# Patient Record
Sex: FEMALE | Race: BLACK OR AFRICAN AMERICAN | Employment: OTHER | URBAN - METROPOLITAN AREA
[De-identification: names, ages, dates, MRNs, and addresses within clinical notes are randomized per-mention and may not be internally consistent; named-entity substitution may affect disease eponyms.]

---

## 2018-06-29 ENCOUNTER — HOSPITAL ENCOUNTER (EMERGENCY)
Age: 33
Discharge: HOME | End: 2018-06-29
Payer: COMMERCIAL

## 2018-06-29 VITALS
RESPIRATION RATE: 18 BRPM | BODY MASS INDEX: 22.08 KG/M2 | TEMPERATURE: 98.2 F | HEIGHT: 62 IN | DIASTOLIC BLOOD PRESSURE: 59 MMHG | HEART RATE: 89 BPM | WEIGHT: 120 LBS | OXYGEN SATURATION: 97 % | SYSTOLIC BLOOD PRESSURE: 106 MMHG

## 2018-06-29 DIAGNOSIS — N30.01 ACUTE CYSTITIS WITH HEMATURIA: ICD-10-CM

## 2018-06-29 DIAGNOSIS — R31.1 BENIGN ESSENTIAL MICROSCOPIC HEMATURIA: Primary | ICD-10-CM

## 2018-06-29 LAB
BACTERIA: ABNORMAL /HPF
BILIRUBIN URINE: NEGATIVE
BLOOD, URINE: ABNORMAL
CHP ED QC CHECK: NORMAL
CLARITY: ABNORMAL
COLOR: YELLOW
EPITHELIAL CELLS, UA: ABNORMAL /HPF
GLUCOSE URINE: NEGATIVE MG/DL
KETONES, URINE: NEGATIVE MG/DL
LEUKOCYTE ESTERASE, URINE: ABNORMAL
NITRITE, URINE: NEGATIVE
PH UA: 6 (ref 5–9)
PREGNANCY TEST URINE, POC: NEGATIVE
PROTEIN UA: ABNORMAL MG/DL
RBC UA: >20 /HPF (ref 0–2)
SPECIFIC GRAVITY UA: >=1.03 (ref 1–1.03)
UROBILINOGEN, URINE: 0.2 E.U./DL
WBC UA: ABNORMAL /HPF (ref 0–5)

## 2018-06-29 PROCEDURE — 81001 URINALYSIS AUTO W/SCOPE: CPT

## 2018-06-29 PROCEDURE — 99283 EMERGENCY DEPT VISIT LOW MDM: CPT

## 2018-06-29 RX ORDER — PHENAZOPYRIDINE HYDROCHLORIDE 100 MG/1
100 TABLET, FILM COATED ORAL 3 TIMES DAILY PRN
Qty: 9 TABLET | Refills: 0 | Status: SHIPPED | OUTPATIENT
Start: 2018-06-29 | End: 2018-07-02

## 2018-06-29 RX ORDER — MELOXICAM 7.5 MG/1
7.5 TABLET ORAL DAILY
COMMUNITY
End: 2018-09-05 | Stop reason: ALTCHOICE

## 2018-06-29 RX ORDER — NITROFURANTOIN 25; 75 MG/1; MG/1
100 CAPSULE ORAL 2 TIMES DAILY
Qty: 20 CAPSULE | Refills: 0 | Status: SHIPPED | OUTPATIENT
Start: 2018-06-29 | End: 2018-07-09

## 2018-06-29 ASSESSMENT — PAIN DESCRIPTION - PAIN TYPE: TYPE: ACUTE PAIN

## 2018-06-29 ASSESSMENT — PAIN DESCRIPTION - LOCATION: LOCATION: HEAD;NECK

## 2018-06-29 ASSESSMENT — PAIN DESCRIPTION - DESCRIPTORS: DESCRIPTORS: ACHING

## 2018-06-29 ASSESSMENT — PAIN SCALES - GENERAL: PAINLEVEL_OUTOF10: 1
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
## 2018-07-22 ENCOUNTER — HOSPITAL ENCOUNTER (OUTPATIENT)
Age: 33
Discharge: HOME | End: 2018-07-24
Payer: COMMERCIAL

## 2018-07-22 ENCOUNTER — HOSPITAL ENCOUNTER (OUTPATIENT)
Dept: GENERAL RADIOLOGY | Age: 33
Discharge: HOME | End: 2018-07-24
Payer: COMMERCIAL

## 2018-07-22 DIAGNOSIS — R52 PAIN: ICD-10-CM

## 2018-07-22 PROCEDURE — 73562 X-RAY EXAM OF KNEE 3: CPT

## 2018-08-07 ENCOUNTER — HOSPITAL ENCOUNTER (OUTPATIENT)
Dept: PSYCHIATRY | Age: 33
Setting detail: THERAPIES SERIES
Discharge: HOME | End: 2018-08-07
Payer: COMMERCIAL

## 2018-08-07 VITALS
WEIGHT: 115 LBS | HEART RATE: 78 BPM | TEMPERATURE: 96.3 F | SYSTOLIC BLOOD PRESSURE: 118 MMHG | RESPIRATION RATE: 16 BRPM | BODY MASS INDEX: 21.16 KG/M2 | HEIGHT: 62 IN | DIASTOLIC BLOOD PRESSURE: 78 MMHG

## 2018-08-07 PROCEDURE — 90791 PSYCH DIAGNOSTIC EVALUATION: CPT

## 2018-08-07 PROCEDURE — 80305 DRUG TEST PRSMV DIR OPT OBS: CPT

## 2018-08-07 ASSESSMENT — LIFESTYLE VARIABLES: HISTORY_ALCOHOL_USE: NO

## 2018-08-20 ENCOUNTER — HOSPITAL ENCOUNTER (OUTPATIENT)
Dept: PSYCHIATRY | Age: 33
Setting detail: THERAPIES SERIES
Discharge: HOME | End: 2018-08-20
Payer: COMMERCIAL

## 2018-08-20 PROCEDURE — 90853 GROUP PSYCHOTHERAPY: CPT

## 2018-08-20 PROCEDURE — 90834 PSYTX W PT 45 MINUTES: CPT

## 2018-08-20 PROCEDURE — 80305 DRUG TEST PRSMV DIR OPT OBS: CPT

## 2018-08-20 PROCEDURE — 99222 1ST HOSP IP/OBS MODERATE 55: CPT | Performed by: FAMILY MEDICINE

## 2018-08-21 ENCOUNTER — HOSPITAL ENCOUNTER (OUTPATIENT)
Dept: PSYCHIATRY | Age: 33
Setting detail: THERAPIES SERIES
Discharge: HOME | End: 2018-08-21
Payer: COMMERCIAL

## 2018-08-21 PROCEDURE — 80305 DRUG TEST PRSMV DIR OPT OBS: CPT

## 2018-08-21 PROCEDURE — 90853 GROUP PSYCHOTHERAPY: CPT

## 2018-08-21 PROCEDURE — 90834 PSYTX W PT 45 MINUTES: CPT

## 2018-08-21 NOTE — H&P
[address]                               HISTORY AND PHYSICAL    PATIENT NAME: Tory Scheuermann                   :        1985  MED REC NO:   [id number]                            ROOM:  ACCOUNT NO:   [de-identified]                           ADMIT DATE: 2018  PROVIDER:     Sangita Cole MD    HISTORY OF PRESENT ILLNESS:  This 70-year-old female admitted with a  history of chemical dependency. The patient states that she was tested  positive for marijuana at the pain clinic, so had been sent in for  rehabilitation program.  The patient has got chronic pain, has been on  multiple pain medications. Also, the patient had been going to the family  physician and had been taking medication for her generalized weakness and  the pain. Does not give any relevant past medical or surgical history. REVIEW OF SYSTEMS:  HEENT:  Normal.  HEART:  Normal.  LUNGS:  Normal.   ABDOMEN:  Normal.  EXTREMITIES:  Normal.  NEUROLOGICAL:  The patient is  stable. No relevant family or social history obtainable. PHYSICAL EXAMINATION:  GENERAL:  At the time of the admission, the patient is walking with a  support canes. HEART:  Normal sinus rhythm. No audible murmurs. LUNGS:  Clear. Fair, equal, and good air entry. ABDOMEN:  Soft. No organomegaly. No masses. EXTREMITIES:  Within normal limits. NEUROLOGICALLY:  The patient is stable. DIAGNOSTIC IMPRESSION:  Chemical dependency, chronic pain. PLAN OF TREATMENT:  Rehabilitation as per the counselor. Followup with the  pain clinic and the family physician, aftercare.         Jacki Dowling MD    D: 2018 18:43:01       T: 2018 19:48:32     [email or web address]  Job#: [id number]     Doc#: [id number]    CC:

## 2018-08-24 ENCOUNTER — HOSPITAL ENCOUNTER (OUTPATIENT)
Dept: PSYCHIATRY | Age: 33
Setting detail: THERAPIES SERIES
Discharge: HOME | End: 2018-08-24
Payer: COMMERCIAL

## 2018-08-24 PROCEDURE — 90834 PSYTX W PT 45 MINUTES: CPT

## 2018-08-24 PROCEDURE — 90853 GROUP PSYCHOTHERAPY: CPT

## 2018-08-28 ENCOUNTER — HOSPITAL ENCOUNTER (OUTPATIENT)
Dept: PSYCHIATRY | Age: 33
Setting detail: THERAPIES SERIES
Discharge: HOME | End: 2018-08-28
Payer: COMMERCIAL

## 2018-08-28 PROCEDURE — 90834 PSYTX W PT 45 MINUTES: CPT

## 2018-08-28 PROCEDURE — 90853 GROUP PSYCHOTHERAPY: CPT

## 2018-08-28 PROCEDURE — 80305 DRUG TEST PRSMV DIR OPT OBS: CPT

## 2018-08-28 NOTE — PLAN OF CARE
and 12 sessions of aftercare.      1. Identify 3-5 past experiences with obsessive thoughts of using marijuana or any other substance of abuse or addiction, share insights during group session and accept feedback from peers. Objective to be met by October 20, 2018.      2. List 2-4 events/situations that lead to cravings and urges to use marijuana or any other substances of abuse or addiction, and then identify and list 2-4 coping skills that have helped you to manage cravings to use marijuana. Objective to be met by October 20, 2018.      3. Share 3-6 behaviors that have assisted you in managing any cravings to use  Marijuana or other substances of abuse or addiction. Ask for feedback from peers in order to add additional behaviors that will assist you in managing cravings and urges to use marijuana or other substances of abuse or addiction.  Objective to be met by October 20, 2018.

## 2018-08-29 ENCOUNTER — HOSPITAL ENCOUNTER (OUTPATIENT)
Dept: PSYCHIATRY | Age: 33
Setting detail: THERAPIES SERIES
Discharge: HOME | End: 2018-08-29
Payer: COMMERCIAL

## 2018-08-29 PROCEDURE — 90853 GROUP PSYCHOTHERAPY: CPT

## 2018-08-29 PROCEDURE — 90834 PSYTX W PT 45 MINUTES: CPT

## 2018-08-30 NOTE — PROGRESS NOTES
Therapy Note    Date:08/24/2018   Start Time: 5:30 PM   End Time: 8:00 PM  Number of Participants 7    Support Meetings attended:0    Type of Group: IOP  Topic: Daily check in exercise - Discussion on identifying Healthy Coping Skills/ Education on the Importance of Changing People, Places and Things. Patient's Goal:  Maintain abstinence/ Develop sober support / Manage cravings & urges/ Eliminate legal problems from [employer].     Notes: 60 minutes: Counselor facilitated daily check in exercise: Carlos Horne shared that she has not had any recent cravings to use marijuana, however she also shared that \"not smoking seems strange. \" She further shared, \"I have been smoking (marijuana) since I was like [age]years old\". 60 minutes: Counselor lead clients in discussion on identifying healthy coping skills that are used to enhance recovery from AOD. Initially Carlos Horne seemed not to recognize what coping skills she has used to help her maintain her abstinence. Upon further reflection Carlos Horne shared that she relies on music to help her with feelings of anxiety or anger, and since she often used marijuana in response to anger and anxiety she agrees that music is a healthy coping skill for when she is at risk of using marijuana. 60 minutes: Counselor provided education on why it is necessary to change people, places and things in recovery. Carlos Horne was engaged in discussion that followed psycho-educational portion of group session. She shared that building a support group and getting a sponsor are two ways of changing people, places and things. Urinalysis Submitted Today? No    Urinalysis Results: 0    If Positive, for which substance:0    Date of Urinalysis Results Review: 0     Urinalysis Results Signed by Client? No    Status After Intervention:  Improved    Participation Level:  Active Listener and Interactive    Participation Quality: Appropriate, Attentive and Sharing      Speech:  normal      Thought Process/Content: Logical      Level of consciousness:  Alert, Oriented x4 and Attentive      Response to Learning: Able to retain information      Endings: None reported     Modes of Intervention: Education, Support, Socialization, Clarifying, Problem-solving and Activity      Discipline Responsible: [facility]      Signature:  Meagan Reaves

## 2018-08-31 ENCOUNTER — HOSPITAL ENCOUNTER (OUTPATIENT)
Dept: PSYCHIATRY | Age: 33
Setting detail: THERAPIES SERIES
Discharge: HOME | End: 2018-08-31
Payer: COMMERCIAL

## 2018-08-31 PROCEDURE — 90834 PSYTX W PT 45 MINUTES: CPT

## 2018-08-31 PROCEDURE — 90853 GROUP PSYCHOTHERAPY: CPT

## 2018-09-04 NOTE — PLAN OF CARE
Problem: Substance Abuse:  Goal: Patient specific goal  Patient specific goal   Treatment Goal #4: Over the remaining 12-18 sessions of [facility] Alirio Hansen will learn about the characteristics of unhealthy boundaries with others and determine how her inability to set healthy boundaries contributes to her addiction. 1). Read assigned literature about boundaries and identify what type of boundaries you have with others. Share 5-7 examples, during group sessions, to support your findings. Objective to be met by October 20th, 2018. 2). List 3 potential dangers to self or others that can occur as a result of not establishing healthy boundaries to support your recovery process. Objective to be met by October 20th, 2018. 3). Develop a written plan including 5-7 ideas you can apply to your life in order to strengthen your boundaries with others and enhance your recovery. Share in group and ask for feedback from peers, in order to expand upon your own ideas. Objective to be met by October 20th, 2018.

## 2018-09-04 NOTE — PROGRESS NOTES
Oriented x4 and Attentive      Response to Learning: Able to retain information and Capable of insight      Endings: None Reported    Modes of Intervention: Education, Support, Socialization, Exploration, Clarifying, Problem-solving, Activity and Media      Discipline Responsible: [facility]      Signature:  Meagan Austin

## 2018-09-05 ENCOUNTER — HOSPITAL ENCOUNTER (EMERGENCY)
Age: 33
Discharge: HOME | End: 2018-09-05
Attending: EMERGENCY MEDICINE
Payer: COMMERCIAL

## 2018-09-05 ENCOUNTER — HOSPITAL ENCOUNTER (OUTPATIENT)
Dept: PSYCHIATRY | Age: 33
Setting detail: THERAPIES SERIES
Discharge: HOME | End: 2018-09-05
Payer: COMMERCIAL

## 2018-09-05 VITALS
RESPIRATION RATE: 20 BRPM | BODY MASS INDEX: 20.33 KG/M2 | OXYGEN SATURATION: 100 % | DIASTOLIC BLOOD PRESSURE: 80 MMHG | TEMPERATURE: 98.4 F | SYSTOLIC BLOOD PRESSURE: 114 MMHG | WEIGHT: 110.5 LBS | HEIGHT: 62 IN | HEART RATE: 90 BPM

## 2018-09-05 DIAGNOSIS — G43.909 MIGRAINE WITHOUT STATUS MIGRAINOSUS, NOT INTRACTABLE, UNSPECIFIED MIGRAINE TYPE: Primary | ICD-10-CM

## 2018-09-05 PROCEDURE — 96372 THER/PROPH/DIAG INJ SC/IM: CPT

## 2018-09-05 PROCEDURE — G0382 LEV 3 HOSP TYPE B ED VISIT: HCPCS

## 2018-09-05 PROCEDURE — 6360000002 HC RX W HCPCS: Performed by: EMERGENCY MEDICINE

## 2018-09-05 PROCEDURE — 99283 EMERGENCY DEPT VISIT LOW MDM: CPT

## 2018-09-05 RX ORDER — TRIAMCINOLONE ACETONIDE 40 MG/ML
40 INJECTION, SUSPENSION INTRA-ARTICULAR; INTRAMUSCULAR ONCE
Status: COMPLETED | OUTPATIENT
Start: 2018-09-05 | End: 2018-09-05

## 2018-09-05 RX ORDER — DEXAMETHASONE SODIUM PHOSPHATE 10 MG/ML
10 INJECTION, SOLUTION INTRAMUSCULAR; INTRAVENOUS ONCE
Status: DISCONTINUED | OUTPATIENT
Start: 2018-09-05 | End: 2018-09-05

## 2018-09-05 RX ORDER — KETOROLAC TROMETHAMINE 30 MG/ML
60 INJECTION, SOLUTION INTRAMUSCULAR; INTRAVENOUS ONCE
Status: COMPLETED | OUTPATIENT
Start: 2018-09-05 | End: 2018-09-05

## 2018-09-05 RX ADMIN — TRIAMCINOLONE ACETONIDE 40 MG: 40 INJECTION, SUSPENSION INTRA-ARTICULAR; INTRAMUSCULAR at 18:35

## 2018-09-05 RX ADMIN — KETOROLAC TROMETHAMINE 60 MG: 30 INJECTION, SOLUTION INTRAMUSCULAR at 18:33

## 2018-09-05 ASSESSMENT — PAIN SCALES - GENERAL
PAINLEVEL_OUTOF10: 10
PAINLEVEL_OUTOF10: 10

## 2018-09-05 ASSESSMENT — PAIN DESCRIPTION - LOCATION: LOCATION: HEAD

## 2018-09-05 NOTE — ED PROVIDER NOTES
HPI:  18,   Time: 6:55 PM         Calvin Faustin is a 28 y.o. female presenting to the ED for migraine headache, beginning 2 days ago. The complaint has been constant, severe in severity, and worsened by nothing. ROS:   Pertinent positives and negatives are stated within HPI, all other systems reviewed and are negative.  --------------------------------------------- PAST HISTORY ---------------------------------------------  Past Medical History:  has a past medical history of Anxiety; Arthritis; Back pain; Chronic back pain; Chronic knee pain; Depression; Headache(784.0); PTSD (post-traumatic stress disorder); Scoliosis; and Sickle cell anemia ([facility] [address] Past Surgical History:  has a past surgical history that includes  section; laparotomy; Patellar tendon repair (5/10/2012); and Hardware Removal (Right, 7/15/13). Social History:  reports that she has been smoking. She has a 15.00 pack-year smoking history. She has never used smokeless tobacco. She reports that she does not drink alcohol or use drugs. Family History: family history is not on file. The patients home medications have been reviewed. Allergies: Hydrocodone and Pcn [penicillins]    -------------------------------------------------- RESULTS -------------------------------------------------  All laboratory and radiology results have been personally reviewed by myself   LABS:  No results found for this visit on 18. RADIOLOGY:  Interpreted by Radiologist.  No orders to display       ------------------------- NURSING NOTES AND VITALS REVIEWED ---------------------------   The nursing notes within the ED encounter and vital signs as below have been reviewed. /80   Pulse 90   Temp 98.4 °F (36.9 °C) (Oral)   Resp 20   Ht 5' 2\" (1.575 m)   Wt 110 lb 8 oz (50.1 kg)   LMP 2018 (Exact Date)   SpO2 100%   Breastfeeding?  No   BMI 20.21 kg/m²   Oxygen Saturation Interpretation:

## 2018-09-06 NOTE — PROGRESS NOTES
Therapy Note    Date:08/31/2018   Start Time: 5:30 PM   End Time: 8:00 PM  Number of Participants:8    Support Meetings attended:0    Type of Group: IOP  Topic: Daily check in exercise - Activity focused on developing skills for choosing a 12 step sponsor  Education on setting boundaries with others. Patient's Goal:  Maintain abstinence/ Develop sober support / Manage cravings & urges/ Eliminate legal problems from [employer].     Notes: 60 minutes: Counselor facilitated daily check in exercise: Lynnette Fleming participated in the daily check in process by sharing that that she has had cravings to use marijuana which she attributes to stress related to moving. She also shared that concerns about other people can be a trigger for her. Lynnette Fleming denies having made any attempt to increase sober support and has not provided any documentation of having attended any sober support group meetings. Client was reminded that attending sober support group meetings is essential to the recovery process as well as successful completion of the IOP level of care. 60 minutes: Counselor lead clients in an activity aimed at developing skills for choosing a 12 step sponsor. Client was also reminded that the only way of obtaining a 12 step sponsor is by attending 12 step meetings. 60 minutes: Counselor provided education on the importance of setting boundaries with others. Lynnette Fleming was especially engaged in discussion of this topic, and indicated that she has\" trouble saying no to others. \" Counselor asked client to explore how inability to say no negatively impacts clients commitment to recovery. Counselor will comprise a treatment plan objective to assist client with exploring the topic of setting boundaries. Urinalysis Submitted Today? No    Urinalysis Results: negative    If Positive, for which substance:0    Date of Urinalysis Results Review: 0     Urinalysis Results Signed by Client?  No    Status After Intervention:  Improved    Participation Level:  Active Listener and Interactive    Participation Quality: Appropriate, Attentive and Sharing      Speech:  normal      Thought Process/Content: Logical      Level of consciousness:  Alert, Oriented x4 and Attentive      Response to Learning: Able to retain information and Capable of insight      Endings: None Reported    Modes of Intervention: Education, Support, Socialization, Exploration, Clarifying, Problem-solving and Activity      Discipline Responsible: [facility]      Signature:  Meagan Muro

## 2018-09-06 NOTE — PROGRESS NOTES
[address]      Client Name: Ayde Donahue  Date of Admission: 08/020/18    Discharge Date: 09/05/2018      Diagnosis: F12.20, F11.10, F7.200   Authorized Person's  Name: Nancy Garzon          License: [facility]    Date: 1/8/1699      Degree of Severity- ADMISSION  Degree of Severity- DISCHARGE    Acute Intoxication withdrawal none Acute Intoxication withdrawal none   Biomedical Conditions/  Complications low Biomedical Conditions/  Complications low   Emotional Behavioral/ Cognitive Conditions low Emotional Behavioral/ Cognitive Conditions low   Treatment Acceptance Resistance moderate Treatment Acceptance Resistance high   Relapse Potential moderate Relapse Potential high   Recovery Environment moderate Recovery Environment moderate       Comments on Dimensional Criteria: 1. No withdrawals reported on admission or discharge. #2 Client reported receiving treatment for biomedical issues. #3 Client did not report receiving treatment for emotional conditions. #4 Client demonstrated no resistance to the program and acceptance of education provided during group sessions. Client was able to acknowledge the need for education. #5 Client has attended the (2) required AA/NA meetings for the treatment requirement and her risk for relapse is low due to client making good progress and demonstrating insight into her poor choices, taking responsibility for her actions and developing coping skills.    #6 Clients environment is low due to client reports that her home is substance free***    Level of Care and Service Provided during Course of Treatment: ***    Client's Response to Treatment: ***    Recommendations and/or Referral for Additional AOD Addiction Treatment or other services: ***    Electronically signed by Nancy Garzon on 9/6/2018 at 5:56 PM
by client's demonstrated inability to adhere to treatment recommendations or comply with program policies. Therefore client was administratively discharged from the [facility] level of care as unsuccessful due to non-compliance. Recommendations and/or Referral for Additional AOD Addiction Treatment or other services: Client was recommended to follow up at [facility].      Electronically signed by Indira Blankenship on 9/6/2018 at 6:10 PM

## 2019-01-29 ENCOUNTER — HOSPITAL ENCOUNTER (OUTPATIENT)
Dept: GENERAL RADIOLOGY | Age: 34
Discharge: HOME | End: 2019-01-31
Payer: COMMERCIAL

## 2019-01-29 ENCOUNTER — HOSPITAL ENCOUNTER (OUTPATIENT)
Age: 34
Discharge: HOME | End: 2019-01-31
Payer: COMMERCIAL

## 2019-01-29 DIAGNOSIS — R52 PAIN: ICD-10-CM

## 2019-01-29 PROCEDURE — 72100 X-RAY EXAM L-S SPINE 2/3 VWS: CPT

## 2019-04-01 ENCOUNTER — HOSPITAL ENCOUNTER (OUTPATIENT)
Dept: GENERAL RADIOLOGY | Age: 34
Discharge: HOME | End: 2019-04-03
Payer: COMMERCIAL

## 2019-04-01 ENCOUNTER — HOSPITAL ENCOUNTER (OUTPATIENT)
Age: 34
Discharge: HOME | End: 2019-04-03
Payer: COMMERCIAL

## 2019-04-01 DIAGNOSIS — M25.552 PAIN OF LEFT HIP JOINT: ICD-10-CM

## 2019-04-01 PROCEDURE — 73502 X-RAY EXAM HIP UNI 2-3 VIEWS: CPT

## 2020-12-29 ENCOUNTER — HOSPITAL ENCOUNTER (EMERGENCY)
Age: 35
Discharge: HOME | End: 2020-12-29
Attending: EMERGENCY MEDICINE
Payer: COMMERCIAL

## 2020-12-29 ENCOUNTER — APPOINTMENT (OUTPATIENT)
Dept: GENERAL RADIOLOGY | Age: 35
End: 2020-12-29
Payer: COMMERCIAL

## 2020-12-29 VITALS
RESPIRATION RATE: 18 BRPM | SYSTOLIC BLOOD PRESSURE: 138 MMHG | OXYGEN SATURATION: 98 % | HEART RATE: 63 BPM | DIASTOLIC BLOOD PRESSURE: 66 MMHG | TEMPERATURE: 97.1 F

## 2020-12-29 LAB
ACANTHOCYTES: ABNORMAL
ALBUMIN SERPL-MCNC: 4 G/DL (ref 3.5–5.2)
ALP BLD-CCNC: 62 U/L (ref 35–104)
ALT SERPL-CCNC: 21 U/L (ref 0–32)
ANION GAP SERPL CALCULATED.3IONS-SCNC: 8 MMOL/L (ref 7–16)
ANISOCYTOSIS: ABNORMAL
AST SERPL-CCNC: 38 U/L (ref 0–31)
BACTERIA: ABNORMAL /HPF
BASOPHILS ABSOLUTE: 0 E9/L (ref 0–0.2)
BASOPHILS RELATIVE PERCENT: 0.6 % (ref 0–2)
BILIRUB SERPL-MCNC: 0.2 MG/DL (ref 0–1.2)
BILIRUBIN URINE: NEGATIVE
BLOOD, URINE: ABNORMAL
BUN BLDV-MCNC: 11 MG/DL (ref 6–20)
CALCIUM SERPL-MCNC: 9.1 MG/DL (ref 8.6–10.2)
CHLORIDE BLD-SCNC: 102 MMOL/L (ref 98–107)
CLARITY: CLEAR
CO2: 27 MMOL/L (ref 22–29)
COLOR: YELLOW
CREAT SERPL-MCNC: 0.7 MG/DL (ref 0.5–1)
EOSINOPHILS ABSOLUTE: 0 E9/L (ref 0.05–0.5)
EOSINOPHILS RELATIVE PERCENT: 0.4 % (ref 0–6)
EPITHELIAL CELLS, UA: ABNORMAL /HPF
GFR AFRICAN AMERICAN: >60
GFR NON-AFRICAN AMERICAN: >60 ML/MIN/1.73
GLUCOSE BLD-MCNC: 138 MG/DL (ref 74–99)
GLUCOSE URINE: NEGATIVE MG/DL
HCG, URINE, POC: NEGATIVE
HCT VFR BLD CALC: 31 % (ref 34–48)
HEMOGLOBIN: 8.9 G/DL (ref 11.5–15.5)
HYPOCHROMIA: ABNORMAL
KETONES, URINE: NEGATIVE MG/DL
LEUKOCYTE ESTERASE, URINE: NEGATIVE
LYMPHOCYTES ABSOLUTE: 0.76 E9/L (ref 1.5–4)
LYMPHOCYTES RELATIVE PERCENT: 13.9 % (ref 20–42)
Lab: NORMAL
MCH RBC QN AUTO: 18.6 PG (ref 26–35)
MCHC RBC AUTO-ENTMCNC: 28.7 % (ref 32–34.5)
MCV RBC AUTO: 64.9 FL (ref 80–99.9)
MONOCYTES ABSOLUTE: 0.32 E9/L (ref 0.1–0.95)
MONOCYTES RELATIVE PERCENT: 6.1 % (ref 2–12)
NEGATIVE QC PASS/FAIL: NORMAL
NEUTROPHILS ABSOLUTE: 4.32 E9/L (ref 1.8–7.3)
NEUTROPHILS RELATIVE PERCENT: 80 % (ref 43–80)
NITRITE, URINE: POSITIVE
OVALOCYTES: ABNORMAL
PDW BLD-RTO: 20 FL (ref 11.5–15)
PH UA: 6 (ref 5–9)
PLATELET # BLD: 319 E9/L (ref 130–450)
PMV BLD AUTO: 9 FL (ref 7–12)
POIKILOCYTES: ABNORMAL
POLYCHROMASIA: ABNORMAL
POSITIVE QC PASS/FAIL: NORMAL
POTASSIUM REFLEX MAGNESIUM: 4.1 MMOL/L (ref 3.5–5)
PROTEIN UA: NEGATIVE MG/DL
RBC # BLD: 4.78 E12/L (ref 3.5–5.5)
RBC UA: ABNORMAL /HPF (ref 0–2)
SODIUM BLD-SCNC: 137 MMOL/L (ref 132–146)
SPECIFIC GRAVITY UA: 1.02 (ref 1–1.03)
TARGET CELLS: ABNORMAL
TOTAL PROTEIN: 7.1 G/DL (ref 6.4–8.3)
UROBILINOGEN, URINE: 0.2 E.U./DL
WBC # BLD: 5.4 E9/L (ref 4.5–11.5)
WBC UA: ABNORMAL /HPF (ref 0–5)

## 2020-12-29 PROCEDURE — 81001 URINALYSIS AUTO W/SCOPE: CPT

## 2020-12-29 PROCEDURE — 99283 EMERGENCY DEPT VISIT LOW MDM: CPT

## 2020-12-29 PROCEDURE — 80053 COMPREHEN METABOLIC PANEL: CPT

## 2020-12-29 PROCEDURE — 71046 X-RAY EXAM CHEST 2 VIEWS: CPT

## 2020-12-29 PROCEDURE — U0003 INFECTIOUS AGENT DETECTION BY NUCLEIC ACID (DNA OR RNA); SEVERE ACUTE RESPIRATORY SYNDROME CORONAVIRUS 2 (SARS-COV-2) (CORONAVIRUS DISEASE [COVID-19]), AMPLIFIED PROBE TECHNIQUE, MAKING USE OF HIGH THROUGHPUT TECHNOLOGIES AS DESCRIBED BY CMS-2020-01-R: HCPCS

## 2020-12-29 PROCEDURE — 85025 COMPLETE CBC W/AUTO DIFF WBC: CPT

## 2020-12-29 ASSESSMENT — ENCOUNTER SYMPTOMS
WHEEZING: 0
SHORTNESS OF BREATH: 0
VOMITING: 0
SINUS PRESSURE: 0
STRIDOR: 0
SWOLLEN GLANDS: 0
EYE ITCHING: 0
SORE THROAT: 0
CONSTIPATION: 0
EYE PAIN: 0
EYE REDNESS: 0
ABDOMINAL PAIN: 0
DIARRHEA: 0
NAUSEA: 0
COUGH: 0
PHOTOPHOBIA: 0
ABDOMINAL DISTENTION: 0
RHINORRHEA: 0
DOUBLE VISION: 0
EYE DISCHARGE: 0
BACK PAIN: 0
ORTHOPNEA: 0

## 2020-12-30 ENCOUNTER — CARE COORDINATION (OUTPATIENT)
Dept: CARE COORDINATION | Age: 35
End: 2020-12-30

## 2020-12-30 NOTE — ED PROVIDER NOTES
This patient reports 2 days of chills. She states she is much better today than she was yesterday. She denies shortness of breath, nausea, vomiting or other complaints. She does have history of sickle cell disease. No immunocompromised status. She states she just finished her menstrual cycle. She relates that she frequently has chills following her menstrual cycle. The history is provided by the patient. Illness   The current episode started yesterday. The onset was gradual. The problem occurs frequently. The problem has been rapidly improving. The problem is mild. Nothing relieves the symptoms. Nothing aggravates the symptoms. Pertinent negatives include no orthopnea, no fever, no decreased vision, no double vision, no eye itching, no photophobia, no abdominal pain, no constipation, no diarrhea, no nausea, no vomiting, no congestion, no ear discharge, no ear pain, no headaches, no hearing loss, no mouth sores, no rhinorrhea, no sore throat, no stridor, no swollen glands, no muscle aches, no neck stiffness, no cough, no URI, no wheezing, no rash, no eye discharge, no eye pain and no eye redness. Review of Systems   Constitutional: Positive for chills. Negative for fever. HENT: Negative for congestion, ear discharge, ear pain, hearing loss, mouth sores, rhinorrhea, sinus pressure and sore throat. Eyes: Negative for double vision, photophobia, pain, discharge, redness and itching. Respiratory: Negative for cough, shortness of breath, wheezing and stridor. Cardiovascular: Negative for chest pain and orthopnea. Gastrointestinal: Negative for abdominal distention, abdominal pain, constipation, diarrhea, nausea and vomiting. Genitourinary: Negative for dysuria and frequency. Musculoskeletal: Negative for arthralgias and back pain. Skin: Negative for rash and wound. Neurological: Negative for weakness and headaches. Hematological: Negative for adenopathy.    All other systems reviewed and are negative. Physical Exam  Vitals signs and nursing note reviewed. Constitutional:       Appearance: She is well-developed. HENT:      Head: Normocephalic and atraumatic. Eyes:      Pupils: Pupils are equal, round, and reactive to light. Neck:      Musculoskeletal: Normal range of motion and neck supple. Cardiovascular:      Rate and Rhythm: Normal rate and regular rhythm. Heart sounds: Normal heart sounds. No murmur. Pulmonary:      Effort: Pulmonary effort is normal. No respiratory distress. Breath sounds: Normal breath sounds. No wheezing or rales. Abdominal:      General: Bowel sounds are normal.      Palpations: Abdomen is soft. Tenderness: There is no abdominal tenderness. There is no guarding or rebound. Skin:     General: Skin is warm and dry. Neurological:      Mental Status: She is alert and oriented to person, place, and time. Cranial Nerves: No cranial nerve deficit. Coordination: Coordination normal.          Procedures     MDM            --------------------------------------------- PAST HISTORY ---------------------------------------------  Past Medical History:  has a past medical history of Anxiety, Arthritis, Back pain, Chronic back pain, Chronic knee pain, Depression, Headache(784.0), PTSD (post-traumatic stress disorder), Scoliosis, and [facility] Past Surgical History:  has a past surgical history that includes  section; laparotomy; Patellar tendon repair (5/10/2012); and Hardware Removal (Right, 7/15/13). Social History:  reports that she has been smoking. She has a 15.00 pack-year smoking history. She has never used smokeless tobacco. She reports that she does not drink alcohol or use drugs. Family History: family history is not on file. The patients home medications have been reviewed.     Allergies: Hydrocodone, Pcn [penicillins], and Food    -------------------------------------------------- RESULTS -------------------------------------------------  Labs:  Results for orders placed or performed during the hospital encounter of 12/29/20   CBC Auto Differential   Result Value Ref Range    WBC 5.4 4.5 - 11.5 E9/L    RBC 4.78 3.50 - 5.50 E12/L    Hemoglobin 8.9 (L) 11.5 - 15.5 g/dL    Hematocrit 31.0 (L) 34.0 - 48.0 %    MCV 64.9 (L) 80.0 - 99.9 fL    MCH 18.6 (L) 26.0 - 35.0 pg    MCHC 28.7 (L) 32.0 - 34.5 %    RDW 20.0 (H) 11.5 - 15.0 fL    Platelets 953 679 - 947 E9/L    MPV 9.0 7.0 - 12.0 fL    Neutrophils % 80.0 43.0 - 80.0 %    Lymphocytes % 13.9 (L) 20.0 - 42.0 %    Monocytes % 6.1 2.0 - 12.0 %    Eosinophils % 0.4 0.0 - 6.0 %    Basophils % 0.6 0.0 - 2.0 %    Neutrophils Absolute 4.32 1.80 - 7.30 E9/L    Lymphocytes Absolute 0.76 (L) 1.50 - 4.00 E9/L    Monocytes Absolute 0.32 0.10 - 0.95 E9/L    Eosinophils Absolute 0.00 (L) 0.05 - 0.50 E9/L    Basophils Absolute 0.00 0.00 - 0.20 E9/L    Anisocytosis 1+     Polychromasia 1+     Hypochromia 3+     Poikilocytes 1+     Acanthocytes 1+     Ovalocytes 1+     Target Cells 2+    Comprehensive Metabolic Panel w/ Reflex to MG   Result Value Ref Range    Sodium 137 132 - 146 mmol/L    Potassium reflex Magnesium 4.1 3.5 - 5.0 mmol/L    Chloride 102 98 - 107 mmol/L    CO2 27 22 - 29 mmol/L    Anion Gap 8 7 - 16 mmol/L    Glucose 138 (H) 74 - 99 mg/dL    BUN 11 6 - 20 mg/dL    CREATININE 0.7 0.5 - 1.0 mg/dL    GFR Non-African American >60 >=60 mL/min/1.73    GFR African American >60     Calcium 9.1 8.6 - 10.2 mg/dL    Total Protein 7.1 6.4 - 8.3 g/dL    Alb 4.0 3.5 - 5.2 g/dL    Total Bilirubin 0.2 0.0 - 1.2 mg/dL    Alkaline Phosphatase 62 35 - 104 U/L    ALT 21 0 - 32 U/L    AST 38 (H) 0 - 31 U/L   Urinalysis, reflex to microscopic   Result Value Ref Range    Color, UA Yellow Straw/Yellow    Clarity, UA Clear Clear    Glucose, Ur Negative Negative mg/dL    Bilirubin Urine Negative Negative    Ketones, Urine Negative Negative mg/dL    Specific [place], UA hospitalization or inpatient management. They have remained hemodynamically stable throughout their entire ED visit and are stable for discharge with outpatient follow-up. The plan has been discussed in detail and they are aware of the specific conditions for emergent return, as well as the importance of follow-up. New Prescriptions    No medications on file       Diagnosis:  1. Chills (without fever)        Disposition:  Patient's disposition: Discharge to home  Patient's condition is stable.            Tony Fitch,   12/29/20 1909

## 2020-12-30 NOTE — CARE COORDINATION
Patient contacted regarding Seymour Osgood. Discussed COVID-19 related testing which was pending at this time. Test results were pending. Patient informed of results, if available? Yes    Care Transition Nurse/ Ambulatory Care Manager contacted the patient by telephone to perform post discharge assessment. Call within 2 business days of discharge: Yes. Verified name and  with patient as identifiers. Provided introduction to self, and explanation of the CTN/ACM role, and reason for call due to risk factors for infection and/or exposure to COVID-19. Symptoms reviewed with patient who verbalized the following symptoms: chills or shaking and sweating. Due to no new or worsening symptoms encounter was not routed to provider for escalation. Discussed follow-up appointments. If no appointment was previously scheduled, appointment scheduling offered: [facility] follow up appointment(s): No future appointments. Non-[facility] follow up appointment(s):     Patient will contact PCP once covid results return. Patient states she is still feeling chills/sweats. She does not have a thermometer and her mother is getting her one. Reinforced hydration and tylenol for fever or body aches. Non-face-to-face services provided:  Obtained and reviewed discharge summary and/or continuity of care documents  Education of patient/family/caregiver/guardian to support self-management-symptom management. Advance Care Planning:   Does patient have an Advance Directive:  not on file. Patient has following risk factors of: no known risk factors. CTN/ACM reviewed discharge instructions, medical action plan and red flags such as increased shortness of breath, increasing fever and signs of decompensation with patient who verbalized understanding. Discussed exposure protocols and quarantine with CDC Guidelines What to do if you are sick with coronavirus disease 2019.  Patient was given an opportunity for questions and

## 2020-12-31 LAB
SARS-COV-2: NOT DETECTED
SOURCE: NORMAL

## 2021-01-04 ENCOUNTER — CARE COORDINATION (OUTPATIENT)
Dept: CARE COORDINATION | Age: 36
End: 2021-01-04

## 2021-01-04 NOTE — CARE COORDINATION
Patient contacted regarding COVID-19 risk and screening. Discussed COVID-19 related testing which was available at this time. Test results were negative. Patient informed of results, if available? Yes. Care Transition Nurse/ Ambulatory Care Manager contacted the patient by telephone to perform follow-up assessment. Verified name and  with patient as identifiers. Patient has following risk factors of: no known risk factors. Symptoms reviewed with patient who verbalized the following symptoms: chills or shaking and sweating. Due to no new or worsening symptoms encounter was not routed to provider for escalation. Education provided regarding infection prevention, and signs and symptoms of COVID-19 and when to seek medical attention with patient who verbalized understanding. Discussed exposure protocols and quarantine from [address] you at higher risk for severe illness  and given an opportunity for questions and concerns. The patient agrees to contact the COVID-19 hotline [phone] or PCP office for questions related to their healthcare. CTN/ACM provided contact information for future reference. From CDC: Are you at higher risk for severe illness?  Wash your hands often.  Avoid close contact (6 feet, which is about two arm lengths) with people who are sick.  Put distance between yourself and other people if COVID-19 is spreading in your community.  Clean and disinfect frequently touched surfaces.  Avoid all cruise travel and non-essential air travel.  Call your healthcare professional if you have concerns about COVID-19 and your underlying condition or if you are sick. For more information on steps you can take to protect yourself, see CDC's How to [place] for follow-up call in 3-5 days based on severity of symptoms and risk factors.

## 2021-01-11 ENCOUNTER — CARE COORDINATION (OUTPATIENT)
Dept: CARE COORDINATION | Age: 36
End: 2021-01-11

## 2021-01-11 NOTE — CARE COORDINATION
Final attempt to reach patient by telephone for ER F/U and Covid Call. Left HIPPA compliant message requesting a return call to discuss condition. Left message on voicemail for patient that this is the final transition Covid Call and to call their specialist/PCP for any problems or issues that may occur.

## 2023-08-28 ENCOUNTER — HOSPITAL ENCOUNTER (OUTPATIENT)
Age: 38
Discharge: HOME | End: 2023-08-30
Payer: MEDICARE

## 2023-08-28 ENCOUNTER — HOSPITAL ENCOUNTER (OUTPATIENT)
Dept: GENERAL RADIOLOGY | Age: 38
Discharge: HOME | End: 2023-08-30
Payer: MEDICARE

## 2023-08-28 DIAGNOSIS — R52 PAIN: ICD-10-CM

## 2023-08-28 PROCEDURE — 73030 X-RAY EXAM OF SHOULDER: CPT

## 2024-05-25 ENCOUNTER — HOSPITAL ENCOUNTER (EMERGENCY)
Age: 39
Discharge: HOME | End: 2024-05-25
Attending: FAMILY MEDICINE
Payer: MEDICARE

## 2024-05-25 VITALS
TEMPERATURE: 97.4 F | SYSTOLIC BLOOD PRESSURE: 135 MMHG | RESPIRATION RATE: 16 BRPM | WEIGHT: 123 LBS | OXYGEN SATURATION: 100 % | DIASTOLIC BLOOD PRESSURE: 90 MMHG | HEART RATE: 89 BPM | BODY MASS INDEX: 21.79 KG/M2 | HEIGHT: 63 IN

## 2024-05-25 DIAGNOSIS — M77.11 RIGHT TENNIS ELBOW: Primary | ICD-10-CM

## 2024-05-25 PROCEDURE — 99283 EMERGENCY DEPT VISIT LOW MDM: CPT

## 2024-05-25 RX ORDER — IBUPROFEN 600 MG/1
600 TABLET ORAL 3 TIMES DAILY PRN
Qty: 30 TABLET | Refills: 0 | Status: SHIPPED | OUTPATIENT
Start: 2024-05-25

## 2024-05-25 ASSESSMENT — PAIN DESCRIPTION - DESCRIPTORS: DESCRIPTORS: ACHING

## 2024-05-25 ASSESSMENT — PAIN DESCRIPTION - LOCATION: LOCATION: ELBOW

## 2024-05-25 ASSESSMENT — PAIN DESCRIPTION - PAIN TYPE: TYPE: ACUTE PAIN

## 2024-05-25 ASSESSMENT — PAIN DESCRIPTION - ORIENTATION: ORIENTATION: RIGHT

## 2024-05-25 ASSESSMENT — PAIN DESCRIPTION - FREQUENCY: FREQUENCY: CONTINUOUS

## 2024-05-25 ASSESSMENT — PAIN - FUNCTIONAL ASSESSMENT: PAIN_FUNCTIONAL_ASSESSMENT: 0-10

## 2024-05-25 NOTE — ED PROVIDER NOTES
HPI:  24,   Time: 1:24 PM EDT         Suze Mcgovern is a 39 y.o. female presenting to the ED for a 2-week history of right elbow pain, worse with movements of the wrist and elbow.  It came on gradually and has progressively worsened.  The only thing she was doing a repetitively at the time was packing boxes for moving.  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            ROS:   Pertinent positives and negatives are stated within HPI, all other systems reviewed and are negative.  --------------------------------------------- PAST HISTORY ---------------------------------------------  Past Medical History:  has a past medical history of Anxiety, Arthritis, Back pain, Chronic back pain, Chronic knee pain, Depression, Headache(784.0), PTSD (post-traumatic stress disorder), Scoliosis, and Sickle cell anemia (HCC).    Past Surgical History:  has a past surgical history that includes  section; laparotomy; Patellar tendon repair (5/10/2012); and Hardware Removal (Right, 7/15/13).    Social History:  reports that she has been smoking. She has a 15.0 pack-year smoking history. She has never used smokeless tobacco. She reports that she does not drink alcohol and does not use drugs.    Family History: family history is not on file.     The patient’s home medications have been reviewed.    Allergies: Hydrocodone, Pcn [penicillins], and Food    -------------------------------------------------- RESULTS -------------------------------------------------  All laboratory and radiology results have been personally reviewed by myself   LABS:  No results found for this visit on 24.    RADIOLOGY:  Interpreted by Radiologist.  No orders to display       ------------------------- NURSING NOTES AND VITALS REVIEWED ---------------------------   The nursing notes within the ED encounter and vital signs as below have been reviewed.   BP (!) 135/90   Pulse 89   Temp

## 2024-08-27 ENCOUNTER — HOSPITAL ENCOUNTER (OUTPATIENT)
Age: 39
Discharge: HOME | End: 2024-08-29
Payer: MEDICARE

## 2024-08-27 ENCOUNTER — HOSPITAL ENCOUNTER (OUTPATIENT)
Dept: GENERAL RADIOLOGY | Age: 39
Discharge: HOME | End: 2024-08-29
Payer: MEDICARE

## 2024-08-27 DIAGNOSIS — R52 PAIN: ICD-10-CM

## 2024-08-27 PROCEDURE — 73080 X-RAY EXAM OF ELBOW: CPT
